# Patient Record
Sex: FEMALE
[De-identification: names, ages, dates, MRNs, and addresses within clinical notes are randomized per-mention and may not be internally consistent; named-entity substitution may affect disease eponyms.]

---

## 2019-09-19 ENCOUNTER — HOSPITAL ENCOUNTER (EMERGENCY)
Dept: HOSPITAL 5 - ED | Age: 24
Discharge: LEFT BEFORE DISCHARGE | End: 2019-09-19
Payer: SELF-PAY

## 2019-09-19 VITALS — DIASTOLIC BLOOD PRESSURE: 77 MMHG | SYSTOLIC BLOOD PRESSURE: 121 MMHG

## 2019-09-19 DIAGNOSIS — Z53.21: ICD-10-CM

## 2019-09-19 DIAGNOSIS — H02.842: ICD-10-CM

## 2019-09-19 DIAGNOSIS — H02.841: Primary | ICD-10-CM

## 2019-09-19 NOTE — EVENT NOTE
ED Screening Note


Date of service: 09/19/19


Time: 13:37


ED Screening Note: 





This is a 24 y.o. F. that presents to the ER with bilateral eye swelling upon 

awaking.





- pain, redness, denies visual changes.





Wears glasses and contacts.





Reports a tight sensation to lower eyelids.





This initial assessment/diagnostic orders/clinical plan/treatment(s) is/are 

subject to change based on patients health status, clinical progression and re-

assessment by fellow clinical providers in the ED. Further treatment and workup 

at subsequent clinical providers discretion. Patient/guardian urged not to elope

from the ED as their condition may be serious if not clinically assessed and 

managed. 





Initial orders include: 





ACC for further evaluation.

## 2021-01-04 ENCOUNTER — HOSPITAL ENCOUNTER (EMERGENCY)
Dept: HOSPITAL 5 - ED | Age: 26
Discharge: LEFT BEFORE DISCHARGE | End: 2021-01-04
Payer: SELF-PAY

## 2021-01-04 DIAGNOSIS — Z00.8: Primary | ICD-10-CM

## 2021-01-04 DIAGNOSIS — Z53.21: ICD-10-CM

## 2021-01-04 NOTE — EMERGENCY DEPARTMENT REPORT
ED ENT HPI





- General


Stated complaint: POSS EAR INFECTION


Time Seen by Provider: 01/04/21 09:35





- History of Present Illness


Initial comments: 





25-year-old female [facility] emerge department complaining of left ear pressure 

stuck a Q-tip in her ear to try to remove the wax and then noted that her 

hearing had a significantly decreased and became worse again emerge department 

for evaluation she reports no bleeding, no headache, no pain, no fever, chills, 

sweats no odynophagia or dysphagia


-: Gradual, Sudden


Location: R ear


Quality: dull


Consistency: constant


Improves with: none


Worsens with: none


Associated Symptoms: denies: gum swelling, pain with swallowing, sore throat, 

discharge from ear, rhinorrhea





- Related Data


                                    Allergies











Allergy/AdvReac Type Severity Reaction Status Date / Time


 


No Known Allergies Allergy   Unverified 09/19/19 13:37














ED Dental HPI





- General


Stated complaint: POSS EAR INFECTION


Time Seen by Provider: 01/04/21 09:35





- Related Data


                                    Allergies











Allergy/AdvReac Type Severity Reaction Status Date / Time


 


No Known Allergies Allergy   Unverified 09/19/19 13:37














ED Review of Systems


ROS: 


Stated complaint: POSS EAR INFECTION


Other details as noted in HPI





Comment: All other systems reviewed and negative





ED Past Medical Hx





- Social History


Smoking Status: Current Every Day Smoker


Substance Use Type: Alcohol





ED Physical Exam





- General


General appearance: alert, in no apparent distress





- Head


Head exam: Present: atraumatic, normocephalic





- Eye


Eye exam: Present: normal appearance, PERRL, EOMI


Pupils: Present: normal accommodation





- ENT


ENT exam: Present: mucous membranes moist, other (Stool impaction to the right 

ear.  No bleeding was noted.  No tragal tenderness is noted.)





- Neck


Neck exam: Present: normal inspection





- Respiratory


Respiratory exam: Present: normal lung sounds bilaterally.  Absent: respiratory 

distress





- Cardiovascular


Cardiovascular Exam: Present: regular rate, normal rhythm.  Absent: systolic 

murmur, diastolic murmur, rubs, gallop





- GI/Abdominal


GI/Abdominal exam: Present: soft, normal bowel sounds





- Extremities Exam


Extremities exam: Present: normal inspection





- Back Exam


Back exam: Present: normal inspection





- Neurological Exam


Neurological exam: Present: alert, oriented X3, normal gait, other (Romberg 

negative gait stable stable stable coordinated and smooth)





- Psychiatric


Psychiatric exam: Present: normal affect, normal mood





- Skin


Skin exam: Present: warm, dry, intact, normal color.  Absent: rash


Critical care attestation.: 


If time is entered above; I have spent that time in minutes in the direct care 

of this critically ill patient, excluding procedure time.








ED Disposition


Clinical Impression: 


 Impacted cerumen of right ear





Disposition: Z-07 MED SCREENING EXAM-LEFT


Is pt being admited?: No


Does the pt Need Aspirin: No


Condition: Stable


Instructions:  Earwax Buildup, Adult, Ear Drops, Adult, Easy-to-Read


Additional Instructions: 


Please utilize Debrox over-the-counter as directed for 2-3 utilization to clear 

ear blockage


Referrals: 


[facility] [Provider Group] - 3-5 Days


[facility] [Other] - 3-5 Days